# Patient Record
Sex: FEMALE | Race: WHITE | NOT HISPANIC OR LATINO | Employment: FULL TIME | URBAN - METROPOLITAN AREA
[De-identification: names, ages, dates, MRNs, and addresses within clinical notes are randomized per-mention and may not be internally consistent; named-entity substitution may affect disease eponyms.]

---

## 2017-02-01 ENCOUNTER — GENERIC CONVERSION - ENCOUNTER (OUTPATIENT)
Dept: OTHER | Facility: OTHER | Age: 37
End: 2017-02-01

## 2017-03-13 ENCOUNTER — ALLSCRIPTS OFFICE VISIT (OUTPATIENT)
Dept: OTHER | Facility: OTHER | Age: 37
End: 2017-03-13

## 2017-04-13 ENCOUNTER — ALLSCRIPTS OFFICE VISIT (OUTPATIENT)
Dept: OTHER | Facility: OTHER | Age: 37
End: 2017-04-13

## 2017-05-01 ENCOUNTER — ALLSCRIPTS OFFICE VISIT (OUTPATIENT)
Dept: OTHER | Facility: OTHER | Age: 37
End: 2017-05-01

## 2017-05-15 ENCOUNTER — APPOINTMENT (OUTPATIENT)
Dept: LAB | Facility: HOSPITAL | Age: 37
End: 2017-05-15
Attending: PODIATRIST
Payer: COMMERCIAL

## 2017-05-15 ENCOUNTER — TRANSCRIBE ORDERS (OUTPATIENT)
Dept: ADMINISTRATIVE | Facility: HOSPITAL | Age: 37
End: 2017-05-15

## 2017-05-15 ENCOUNTER — ANESTHESIA EVENT (OUTPATIENT)
Dept: PERIOP | Facility: HOSPITAL | Age: 37
End: 2017-05-15
Payer: COMMERCIAL

## 2017-05-15 ENCOUNTER — APPOINTMENT (OUTPATIENT)
Dept: PREADMISSION TESTING | Facility: HOSPITAL | Age: 37
End: 2017-05-15
Payer: COMMERCIAL

## 2017-05-15 VITALS
DIASTOLIC BLOOD PRESSURE: 74 MMHG | SYSTOLIC BLOOD PRESSURE: 146 MMHG | BODY MASS INDEX: 41.07 KG/M2 | HEIGHT: 68 IN | WEIGHT: 271 LBS | HEART RATE: 72 BPM | TEMPERATURE: 97.4 F | RESPIRATION RATE: 16 BRPM

## 2017-05-15 DIAGNOSIS — Z01.818 PREOP EXAMINATION: ICD-10-CM

## 2017-05-15 DIAGNOSIS — Z01.818 PREOP EXAMINATION: Primary | ICD-10-CM

## 2017-05-15 LAB
ANION GAP SERPL CALCULATED.3IONS-SCNC: 8 MMOL/L (ref 4–13)
BUN SERPL-MCNC: 8 MG/DL (ref 5–25)
CALCIUM SERPL-MCNC: 9.1 MG/DL (ref 8.3–10.1)
CHLORIDE SERPL-SCNC: 104 MMOL/L (ref 100–108)
CO2 SERPL-SCNC: 26 MMOL/L (ref 21–32)
CREAT SERPL-MCNC: 1.21 MG/DL (ref 0.6–1.3)
GFR SERPL CREATININE-BSD FRML MDRD: 49.8 ML/MIN/1.73SQ M
GLUCOSE SERPL-MCNC: 88 MG/DL (ref 65–140)
POTASSIUM SERPL-SCNC: 3.7 MMOL/L (ref 3.5–5.3)
SODIUM SERPL-SCNC: 138 MMOL/L (ref 136–145)

## 2017-05-15 PROCEDURE — 80048 BASIC METABOLIC PNL TOTAL CA: CPT

## 2017-05-15 PROCEDURE — 36415 COLL VENOUS BLD VENIPUNCTURE: CPT

## 2017-05-15 RX ORDER — ALBUTEROL SULFATE 90 UG/1
2 AEROSOL, METERED RESPIRATORY (INHALATION) EVERY 6 HOURS PRN
COMMUNITY

## 2017-05-15 RX ORDER — LOSARTAN POTASSIUM 25 MG/1
25 TABLET ORAL EVERY EVENING
COMMUNITY

## 2017-05-15 RX ORDER — LEVOTHYROXINE SODIUM 88 UG/1
88 TABLET ORAL EVERY EVENING
COMMUNITY
End: 2018-04-19 | Stop reason: SDUPTHER

## 2017-05-15 RX ORDER — SODIUM CHLORIDE 9 MG/ML
125 INJECTION, SOLUTION INTRAVENOUS CONTINUOUS
Status: CANCELLED | OUTPATIENT
Start: 2017-05-15

## 2017-05-17 ENCOUNTER — HOSPITAL ENCOUNTER (OUTPATIENT)
Facility: HOSPITAL | Age: 37
Setting detail: OUTPATIENT SURGERY
Discharge: HOME | End: 2017-05-17
Attending: PODIATRIST | Admitting: PODIATRIST
Payer: COMMERCIAL

## 2017-05-17 ENCOUNTER — ANESTHESIA (OUTPATIENT)
Dept: PERIOP | Facility: HOSPITAL | Age: 37
End: 2017-05-17
Payer: COMMERCIAL

## 2017-05-17 VITALS
WEIGHT: 271 LBS | TEMPERATURE: 98 F | OXYGEN SATURATION: 96 % | DIASTOLIC BLOOD PRESSURE: 77 MMHG | RESPIRATION RATE: 16 BRPM | HEART RATE: 63 BPM | HEIGHT: 68 IN | SYSTOLIC BLOOD PRESSURE: 125 MMHG | BODY MASS INDEX: 41.07 KG/M2

## 2017-05-17 DIAGNOSIS — G89.18 POST-OPERATIVE PAIN: Primary | ICD-10-CM

## 2017-05-17 LAB — EXT PREGNANCY TEST URINE: NEGATIVE

## 2017-05-17 PROCEDURE — 81025 URINE PREGNANCY TEST: CPT | Performed by: PODIATRIST

## 2017-05-17 PROCEDURE — A9270 NON-COVERED ITEM OR SERVICE: HCPCS | Performed by: PODIATRIST

## 2017-05-17 RX ORDER — PROPOFOL 10 MG/ML
INJECTION, EMULSION INTRAVENOUS CONTINUOUS PRN
Status: DISCONTINUED | OUTPATIENT
Start: 2017-05-17 | End: 2017-05-17

## 2017-05-17 RX ORDER — LIDOCAINE HYDROCHLORIDE 10 MG/ML
INJECTION, SOLUTION EPIDURAL; INFILTRATION; INTRACAUDAL; PERINEURAL AS NEEDED
Status: DISCONTINUED | OUTPATIENT
Start: 2017-05-17 | End: 2017-05-17 | Stop reason: HOSPADM

## 2017-05-17 RX ORDER — SODIUM CHLORIDE 9 MG/ML
125 INJECTION, SOLUTION INTRAVENOUS CONTINUOUS
Status: DISCONTINUED | OUTPATIENT
Start: 2017-05-17 | End: 2017-05-17 | Stop reason: HOSPADM

## 2017-05-17 RX ORDER — ONDANSETRON 2 MG/ML
4 INJECTION INTRAMUSCULAR; INTRAVENOUS EVERY 6 HOURS PRN
Status: DISCONTINUED | OUTPATIENT
Start: 2017-05-17 | End: 2017-05-17 | Stop reason: HOSPADM

## 2017-05-17 RX ORDER — OXYCODONE HYDROCHLORIDE AND ACETAMINOPHEN 5; 325 MG/1; MG/1
1 TABLET ORAL EVERY 4 HOURS PRN
Status: DISCONTINUED | OUTPATIENT
Start: 2017-05-17 | End: 2017-05-17 | Stop reason: HOSPADM

## 2017-05-17 RX ORDER — PROPOFOL 10 MG/ML
INJECTION, EMULSION INTRAVENOUS AS NEEDED
Status: DISCONTINUED | OUTPATIENT
Start: 2017-05-17 | End: 2017-05-17 | Stop reason: SURG

## 2017-05-17 RX ORDER — FENTANYL CITRATE 50 UG/ML
50 INJECTION, SOLUTION INTRAMUSCULAR; INTRAVENOUS
Status: DISCONTINUED | OUTPATIENT
Start: 2017-05-17 | End: 2017-05-17 | Stop reason: HOSPADM

## 2017-05-17 RX ORDER — FENTANYL CITRATE 50 UG/ML
INJECTION, SOLUTION INTRAMUSCULAR; INTRAVENOUS AS NEEDED
Status: DISCONTINUED | OUTPATIENT
Start: 2017-05-17 | End: 2017-05-17 | Stop reason: SURG

## 2017-05-17 RX ORDER — MIDAZOLAM HYDROCHLORIDE 1 MG/ML
INJECTION INTRAMUSCULAR; INTRAVENOUS AS NEEDED
Status: DISCONTINUED | OUTPATIENT
Start: 2017-05-17 | End: 2017-05-17 | Stop reason: SURG

## 2017-05-17 RX ORDER — DEXAMETHASONE SODIUM PHOSPHATE 4 MG/ML
INJECTION, SOLUTION INTRA-ARTICULAR; INTRALESIONAL; INTRAMUSCULAR; INTRAVENOUS; SOFT TISSUE AS NEEDED
Status: DISCONTINUED | OUTPATIENT
Start: 2017-05-17 | End: 2017-05-17 | Stop reason: HOSPADM

## 2017-05-17 RX ORDER — OXYCODONE HYDROCHLORIDE AND ACETAMINOPHEN 5; 325 MG/1; MG/1
1 TABLET ORAL EVERY 6 HOURS PRN
Qty: 12 TABLET | Refills: 0 | Status: SHIPPED | OUTPATIENT
Start: 2017-05-17 | End: 2017-05-27

## 2017-05-17 RX ADMIN — FENTANYL CITRATE 50 MCG: 50 INJECTION, SOLUTION INTRAMUSCULAR; INTRAVENOUS at 08:55

## 2017-05-17 RX ADMIN — PROPOFOL 80 MCG/KG/MIN: 10 INJECTION, EMULSION INTRAVENOUS at 08:05

## 2017-05-17 RX ADMIN — OXYCODONE HYDROCHLORIDE AND ACETAMINOPHEN 1 TABLET: 5; 325 TABLET ORAL at 10:46

## 2017-05-17 RX ADMIN — SODIUM CHLORIDE: 0.9 INJECTION, SOLUTION INTRAVENOUS at 07:57

## 2017-05-17 RX ADMIN — SODIUM CHLORIDE 125 ML/HR: 0.9 INJECTION, SOLUTION INTRAVENOUS at 07:13

## 2017-05-17 RX ADMIN — PROPOFOL 100 MG: 10 INJECTION, EMULSION INTRAVENOUS at 08:05

## 2017-05-17 RX ADMIN — MIDAZOLAM HYDROCHLORIDE 2 MG: 1 INJECTION, SOLUTION INTRAMUSCULAR; INTRAVENOUS at 07:57

## 2017-05-17 RX ADMIN — FENTANYL CITRATE 100 MCG: 50 INJECTION, SOLUTION INTRAMUSCULAR; INTRAVENOUS at 08:05

## 2017-05-22 ENCOUNTER — ALLSCRIPTS OFFICE VISIT (OUTPATIENT)
Dept: OTHER | Facility: OTHER | Age: 37
End: 2017-05-22

## 2017-09-21 ENCOUNTER — HOSPITAL ENCOUNTER (OUTPATIENT)
Dept: RADIOLOGY | Facility: MEDICAL CENTER | Age: 37
Discharge: HOME | End: 2017-09-21
Payer: COMMERCIAL

## 2017-09-21 ENCOUNTER — GENERIC CONVERSION - ENCOUNTER (OUTPATIENT)
Dept: OTHER | Facility: OTHER | Age: 37
End: 2017-09-21

## 2017-09-21 ENCOUNTER — TRANSCRIBE ORDERS (OUTPATIENT)
Dept: ADMINISTRATIVE | Facility: HOSPITAL | Age: 37
End: 2017-09-21

## 2017-09-21 DIAGNOSIS — S09.90XA HEAD INJURY, UNSPECIFIED: ICD-10-CM

## 2017-09-21 DIAGNOSIS — S09.90XA INJURY OF HEAD: ICD-10-CM

## 2017-09-21 DIAGNOSIS — S09.90XA HEAD INJURY, UNSPECIFIED: Primary | ICD-10-CM

## 2017-09-21 PROCEDURE — 70450 CT HEAD/BRAIN W/O DYE: CPT

## 2018-01-12 ENCOUNTER — GENERIC CONVERSION - ENCOUNTER (OUTPATIENT)
Dept: OTHER | Facility: OTHER | Age: 38
End: 2018-01-12

## 2018-01-12 VITALS
HEIGHT: 67 IN | SYSTOLIC BLOOD PRESSURE: 112 MMHG | WEIGHT: 274.25 LBS | DIASTOLIC BLOOD PRESSURE: 70 MMHG | OXYGEN SATURATION: 98 % | BODY MASS INDEX: 43.04 KG/M2 | RESPIRATION RATE: 16 BRPM | TEMPERATURE: 97.8 F | HEART RATE: 80 BPM

## 2018-01-12 VITALS
BODY MASS INDEX: 43.03 KG/M2 | HEIGHT: 67 IN | RESPIRATION RATE: 16 BRPM | WEIGHT: 274.2 LBS | HEART RATE: 81 BPM | SYSTOLIC BLOOD PRESSURE: 120 MMHG | TEMPERATURE: 97.5 F | OXYGEN SATURATION: 98 % | DIASTOLIC BLOOD PRESSURE: 78 MMHG

## 2018-01-13 VITALS
WEIGHT: 271.4 LBS | DIASTOLIC BLOOD PRESSURE: 80 MMHG | HEIGHT: 67 IN | SYSTOLIC BLOOD PRESSURE: 118 MMHG | HEART RATE: 76 BPM | TEMPERATURE: 97.6 F | OXYGEN SATURATION: 99 % | RESPIRATION RATE: 16 BRPM | BODY MASS INDEX: 42.6 KG/M2

## 2018-01-13 VITALS
HEART RATE: 60 BPM | HEIGHT: 67 IN | SYSTOLIC BLOOD PRESSURE: 112 MMHG | WEIGHT: 273.13 LBS | BODY MASS INDEX: 42.87 KG/M2 | DIASTOLIC BLOOD PRESSURE: 68 MMHG | RESPIRATION RATE: 16 BRPM | OXYGEN SATURATION: 98 % | TEMPERATURE: 97.6 F

## 2018-01-13 NOTE — PROGRESS NOTES
Assessment    1  Benign essential HTN (401 1) (I10)   2  Glomerulonephritis, focal sclerosing (582 1) (N05 1)   3  Hypothyroidism (244 9) (E03 9)   4  Obesity (278 00) (E66 9)   5  Plantar fasciitis (728 71) (M72 2)    Plan  Benign essential HTN    · (1) CBC/PLT/DIFF; Status:Active; Requested for:15Oct2016;   Benign essential HTN, Glomerulonephritis, focal sclerosing    · (1) COMPREHENSIVE METABOLIC PANEL; Status:Active; Requested for:15Oct2016; Health Maintenance    · (1) LIPID PANEL, FASTING; Status:Active; Requested for:15Oct2016;   Hypothyroidism    · Levothyroxine Sodium 88 MCG Oral Tablet; TAKE 1 TABLET DAILY   · (1) T4, FREE; Status:Active; Requested for:15Oct2016;    · (1) TSH; Status:Active; Requested for:15Oct2016;   Plantar fasciitis    · [facility] ( PODIATRY ) Physician Referral  eval and tx  Status: Hold For -  Scheduling  Requested for: [id number]  Care Summary provided  : Yes    Discussion/Summary  health maintenance visit the risks and benefits of cervical cancer screening were discussed cervical cancer screening is current Breast cancer screening: the risks and benefits of breast cancer screening were discussed and breast cancer screening is not indicated  Colorectal cancer screening: the risks and benefits of colorectal cancer screening were discussed and colorectal cancer screening is not indicated  Osteoporosis screening: the risks and benefits of osteoporosis screening were discussed and bone mineral density testing is not indicated  The risks and benefits of immunizations were discussed and immunizations are up to date  Advice and education were given regarding weight loss  Chief Complaint  physical      History of Present Illness  HM, Adult Female: The patient is being seen for a health maintenance evaluation  Social History: Household members include 1 daughter(s)  She is   The patient has never smoked cigarettes  She reports rare alcohol use   She has never used illicit drugs  General Health: The patient's health since the last visit is described as fair  She has regular dental visits  She denies vision problems  She denies hearing loss  Lifestyle:  She consumes a diverse and healthy diet  She has weight concerns  She exercises regularly  She does not use tobacco  She consumes alcohol  She denies drug use  Reproductive health: the patient is premenopausal    Screening: Cervical cancer screening includes a pap smear performed last year  Breast cancer screening includes no previous mammogram  She hasn't been previously screened for colorectal cancer  Metabolic screening includes lipid profile performed within the past five years and glucose screening performed last year  Cardiovascular risk factors: hypertension, but no diabetes, no tobacco use and no illicit drug use  Safety elements used: safe driving habits and smoke detector  Risk findings: no guns at home  Review of Systems    Constitutional: No fever, no chills, feels well, no tiredness, no recent weight gain or weight loss  ENT: no complaints of earache, no loss of hearing, no nose bleeds, no nasal discharge, no sore throat, no hoarseness  Cardiovascular: No complaints of slow heart rate, no fast heart rate, no chest pain, no palpitations, no leg claudication, no lower extremity edema  Respiratory: No complaints of shortness of breath, no wheezing, no cough, no SOB on exertion, no orthopnea, no PND  Active Problems    1  Asthma (493 90) (J45 909)   2  Hypothyroidism (244 9) (E03 9)   3  Obesity (278 00) (E66 9)   4  Proteinuria (791 0) (R80 9)   5   Vitamin D deficiency (268 9) (E55 9)    Past Medical History    · Acute bronchitis (466 0) (J20 9)   · History of Bacterial vaginosis (616 10,041 9) (N76 0,B96 89)   · History of Dysuria (788 1) (R30 0)   · History of dizziness (V13 89) (L52 249)   · History of endometriosis (V13 29) (Z87 42)   · History of Peroneal tendinitis of right lower extremity (726 79) (M76 71)   · History of Plantar fasciitis (728 71) (M72 2)   · History of Strep pharyngitis (034 0) (J02 0)   · History of Tibialis posterior tendinitis (726 72) (M76 829)   · History of Vaginitis (616 10) (N76 0)    Surgical History    · History of Biopsy Renal   · History of Oral Surgery Tooth Extraction   · History of Surgery Excision Of Lingual Tonsils   · History of Tonsillectomy   · History of Uvulectomy    Family History  Mother    · Family history unknown (V49 89) (Z73 8)  Father    · Family history of hypertension (V17 49) (Z82 49)  Paternal Grandmother    · Family history of diabetes mellitus (V18 0) (Z83 3)  Aunt    · Family history of mental disorder (V17 0) (Z81 8)  Uncle    · Family history of mental disorder (V17 0) (Z81 8)  Cousin    · Family history of mental disorder (V17 0) (Z81 8)    Social History    · Always uses seat belt   · Drinks caffeinated tea   · Never a smoker   · Wears helmet with cycling    Current Meds   1  Bactrim -160 MG Oral Tablet; one tab q m-w-f; Therapy: 39ZGY1776 to Recorded   2  Cozaar 25 MG Oral Tablet; Therapy: 31PLY3245 to Recorded   3  CycloSPORINE 100 MG Oral Capsule; Therapy: 19ZOQ6574 to Recorded   4  CycloSPORINE Modified 50 MG Oral Capsule; 3 TABS IN AM;   Therapy: 80LJQ6283 to Recorded   5  HydroCHLOROthiazide 25 MG Oral Tablet; TAKE 1 TABLET DAILY; Therapy: 41MQX5348 to (Evaluate:34Dkm4974) Recorded   6  Levothyroxine Sodium 88 MCG Oral Tablet; TAKE 1 TABLET DAILY; Therapy: 40Ufp8862 to (Evaluate:14Mar2017)  Requested for: 60Wdk2324; Last   Rx:25Woq3562 Ordered   7  Losartan Potassium 25 MG Oral Tablet; TAKE 1 TABLET DAILY AS DIRECTED; Therapy: 70MPA0301 to (Evaluate:87Uvl8966) Recorded   8  Norethindrone Acetate 5 MG Oral Tablet; Therapy: (Recorded:11Nov2015) to Recorded   9  PredniSONE 5 MG Oral Tablet; daily; Therapy: 28JQY7409 to Recorded   10   Vitamin D (Ergocalciferol) 29156 UNIT Oral Capsule; TAKE 1 CAPSULE WEEKLY; Therapy: 74PRS5571 to (Last Rx:04Mar2015)  Requested for: 11BFZ3542 Ordered   11  ZyrTEC Allergy 10 MG Oral Tablet; TAKE 1 TABLET DAILY AS DIRECTED; Therapy: 11Rbc2790 to (Evaluate:17Oct2014) Recorded    Allergies    1  Morphine Derivatives    Vitals   Recorded: 94DTA1945 28:36UW   Systolic 389, LUE, Sitting   Diastolic 86, LUE, Sitting   Heart Rate 74, L Brachial Artery   Pulse Quality Normal, L Brachial Artery   Respiration Quality Normal   Respiration 16   Temperature 96 9 F, Tympanic   LMP 01Oct2016   Height 5 ft 7 in   Weight 272 lb    BMI Calculated 42 6   BSA Calculated 2 3     Physical Exam    Constitutional   General appearance: Abnormal   obese  Head and Face   Head and face: Normal     Palpation of the face and sinuses: No sinus tenderness  Eyes   Conjunctiva and lids: No swelling, erythema or discharge  Pupils and irises: Equal, round, reactive to light  Ears, Nose, Mouth, and Throat   External inspection of ears and nose: Normal     Otoscopic examination: Tympanic membranes translucent with normal light reflex  Canals patent without erythema  Hearing: Normal     Nasal mucosa, septum, and turbinates: Normal without edema or erythema  Lips, teeth, and gums: Normal, good dentition  Oropharynx: Normal with no erythema, edema, exudate or lesions  Neck   Neck: Supple, symmetric, trachea midline, no masses  Thyroid: Normal, no thyromegaly  Pulmonary   Respiratory effort: No increased work of breathing or signs of respiratory distress  Auscultation of lungs: Clear to auscultation  Cardiovascular   Auscultation of heart: Normal rate and rhythm, normal S1 and S2, no murmurs  Abdomen   Abdomen: Non-tender, no masses  Liver and spleen: No hepatomegaly or splenomegaly  Genitourinary   External genitalia and vagina: Normal, no lesions appreciated         Results/Data  PHQ-2 Adult Depression Screening 15Oct2016 08:59AM User, Ahs     Test Name Result Flag Reference   PHQ-2 Adult Depression Score 0     Over the last two weeks, how often have you been bothered by any of the following problems? Little interest or pleasure in doing things: Not at all - 0  Feeling down, depressed, or hopeless: Not at all - 0   PHQ-2 Adult Depression Screening Negative       Urine Dip Non-Automated- POC 11VDQ3023 08:59AM Dulce Maria Quiles     Test Name Result Flag Reference   Color Yellow     Clarity Transparent     Leukocytes negative     Nitrite negative     Blood negative     Bilirubin negative     Urobilinogen negative     Protein ++100     Ph 5     Specific Gravity 1 020     Ketone negative     Glucose negative         Procedure    Procedure: Hearing Acuity Test    Indication: Routine screeing  Audiometry: Normal bilaterally  Hearing in the right ear: 20 decibals at 500 hertz, 20 decibals at 1000 hertz, 20 decibals at 2000 hertz, 20 decibals at 4000 hertz, 20 decibals at 6000 hertz and 20 decibals at 8000 hertz  Hearing in the left ear: 20 decibals at 500 hertz, 20 decibals at 1000 hertz, 20 decibals at 2000 hertz, 20 decibals at 4000 hertz, 20 decibals at 6000 hertz and 20 decibals at 8000 hertz  Procedure: Visual Acuity Test    Indication: routine screening  Results: 20/15 in both eyes with corrective device, 20/15 in the right eye with corrective device, 20/15 in the left eye with corrective device normal in both eyes  Color vision was screened with the [place] Test and the results were normal    The patient tolerated the procedure well  There were no complications  Future Appointments    Date/Time Provider Specialty Site   04/12/2017 06:00 PM DARYA Mcgrath   Family Medicine [address] [place] [address]   Electronically signed by : Bala Pat DO; Oct 15 2016  2:02PM EST                       (Author)

## 2018-01-17 ENCOUNTER — TRANSCRIBE ORDERS (OUTPATIENT)
Dept: ADMINISTRATIVE | Facility: HOSPITAL | Age: 38
End: 2018-01-17

## 2018-01-17 DIAGNOSIS — N04.1 NEPHROTIC SYNDROME WITH FOCAL GLOMERULOSCLEROSIS: Primary | ICD-10-CM

## 2018-01-17 DIAGNOSIS — N18.2 CHRONIC KIDNEY DISEASE, STAGE II (MILD): ICD-10-CM

## 2018-01-17 DIAGNOSIS — E78.5 HYPERLIPIDEMIA, UNSPECIFIED HYPERLIPIDEMIA TYPE: ICD-10-CM

## 2018-01-17 DIAGNOSIS — L98.9 SKIN LESION: ICD-10-CM

## 2018-01-17 DIAGNOSIS — R80.9 PROTEINURIA, UNSPECIFIED TYPE: ICD-10-CM

## 2018-01-22 VITALS
BODY MASS INDEX: 42.53 KG/M2 | DIASTOLIC BLOOD PRESSURE: 78 MMHG | OXYGEN SATURATION: 97 % | HEART RATE: 72 BPM | TEMPERATURE: 97.6 F | RESPIRATION RATE: 16 BRPM | HEIGHT: 67 IN | WEIGHT: 271 LBS | SYSTOLIC BLOOD PRESSURE: 108 MMHG

## 2018-01-24 VITALS
HEIGHT: 67 IN | TEMPERATURE: 98.2 F | RESPIRATION RATE: 16 BRPM | BODY MASS INDEX: 41.91 KG/M2 | HEART RATE: 87 BPM | OXYGEN SATURATION: 98 % | DIASTOLIC BLOOD PRESSURE: 70 MMHG | WEIGHT: 267 LBS | SYSTOLIC BLOOD PRESSURE: 116 MMHG

## 2018-02-15 ENCOUNTER — APPOINTMENT (OUTPATIENT)
Dept: LAB | Facility: CLINIC | Age: 38
End: 2018-02-15
Payer: COMMERCIAL

## 2018-02-15 DIAGNOSIS — E78.5 HYPERLIPIDEMIA, UNSPECIFIED HYPERLIPIDEMIA TYPE: ICD-10-CM

## 2018-02-15 DIAGNOSIS — N04.1 NEPHROTIC SYNDROME WITH FOCAL GLOMERULOSCLEROSIS: ICD-10-CM

## 2018-02-15 DIAGNOSIS — R80.9 PROTEINURIA, UNSPECIFIED TYPE: ICD-10-CM

## 2018-02-15 DIAGNOSIS — N18.2 CHRONIC KIDNEY DISEASE, STAGE II (MILD): ICD-10-CM

## 2018-02-15 LAB
CHOLEST SERPL-MCNC: 183 MG/DL (ref 50–200)
HDLC SERPL-MCNC: 44 MG/DL (ref 40–60)
LDLC SERPL CALC-MCNC: 120 MG/DL (ref 0–100)
TRIGL SERPL-MCNC: 97 MG/DL
TSH SERPL DL<=0.05 MIU/L-ACNC: 1.08 UIU/ML (ref 0.36–3.74)

## 2018-02-15 PROCEDURE — 84443 ASSAY THYROID STIM HORMONE: CPT

## 2018-02-15 PROCEDURE — 80061 LIPID PANEL: CPT

## 2018-02-15 PROCEDURE — 36415 COLL VENOUS BLD VENIPUNCTURE: CPT

## 2018-02-26 ENCOUNTER — OFFICE VISIT (OUTPATIENT)
Dept: PLASTIC SURGERY | Facility: CLINIC | Age: 38
End: 2018-02-26
Payer: COMMERCIAL

## 2018-02-26 VITALS — HEIGHT: 68 IN | WEIGHT: 273.4 LBS | BODY MASS INDEX: 41.43 KG/M2

## 2018-02-26 DIAGNOSIS — L98.9 SKIN LESION: ICD-10-CM

## 2018-02-26 PROCEDURE — 88305 TISSUE EXAM BY PATHOLOGIST: CPT | Performed by: PATHOLOGY

## 2018-02-26 PROCEDURE — 88305 TISSUE EXAM BY PATHOLOGIST: CPT | Performed by: PHYSICIAN ASSISTANT

## 2018-02-26 PROCEDURE — 11100 PR BIOPSY OF SKIN LESION: CPT | Performed by: PHYSICIAN ASSISTANT

## 2018-02-26 PROCEDURE — 99203 OFFICE O/P NEW LOW 30 MIN: CPT | Performed by: PHYSICIAN ASSISTANT

## 2018-02-26 NOTE — PROGRESS NOTES
Assessment/Plan:  Ladonna Vital is a pleasant 22-year-old female who presents to our office for evaluation of a skin lesion on her right neck  Please see HPI  I did a biopsy in the office today  She was given instructions on how to care for the wound  We will call her with the results  Diagnoses and all orders for this visit:    Skin lesion  -     Ambulatory referral to Plastic Surgery  -     Tissue Exam          Subjective:      Patient ID: Thi Friday is a 42 y o  female  HPI   Ladonna Vital is a pleasant 22-year-old female who presents to our office for evaluation of a skin lesion on her right neck  She states that the lesion has been present for years however it has now become painful over the last month  The following portions of the patient's history were reviewed and updated as appropriate: allergies, current medications, past family history, past medical history, past social history, past surgical history and problem list     Review of Systems   HENT: Negative for hearing loss  Eyes: Negative for visual disturbance  Respiratory: Negative for shortness of breath  Cardiovascular: Negative for chest pain  Gastrointestinal: Negative for abdominal pain, blood in stool, constipation, diarrhea, nausea and vomiting  Genitourinary: Negative for hematuria  Musculoskeletal: Negative for gait problem  Skin:        As per HPI  Neurological: Negative for seizures and headaches  Hematological: Does not bruise/bleed easily  Psychiatric/Behavioral: The patient is not nervous/anxious  Objective:      Ht 5' 7 5" (1 715 m)   Wt 124 kg (273 lb 6 4 oz)   BMI 42 19 kg/m²          Physical Exam   Constitutional: She is oriented to person, place, and time  She appears well-developed and well-nourished  No distress  HENT:   Head: Normocephalic and atraumatic  Eyes: EOM are normal  Pupils are equal, round, and reactive to light  No scleral icterus  Neck: Neck supple   No tracheal deviation present  No thyromegaly present  Cardiovascular: Normal rate and regular rhythm  Exam reveals no gallop and no friction rub  No murmur heard  Pulmonary/Chest: Effort normal and breath sounds normal  She has no wheezes  She has no rales  Abdominal: Soft  Bowel sounds are normal  She exhibits no distension  There is no tenderness  There is no rebound and no guarding  Musculoskeletal: Normal range of motion  Lymphadenopathy:     She has no cervical adenopathy  Neurological: She is alert and oriented to person, place, and time  No cranial nerve deficit  Skin:   Right neck skin lesion noted measuring approximately 3 mm round and raised and brown in color  It appears irritated  Photos taken  Psychiatric: She has a normal mood and affect  Procedure:  Verbal consent obtained  The neck was prepped with alcohol  I infiltrated an area with 1% lidocaine with epinephrine around the lesion  Shave biopsy was performed and a dressing applied

## 2018-02-26 NOTE — LETTER
February 26, 2018     Sejal Levine [address] [facility] [address]    Patient: Monika Chirinos   YOB: 1980   Date of Visit: 2/26/2018       Dear Dr Gino Keith:    Thank you for referring Monika Chirinos to me for evaluation  Below are my notes for this consultation  If you have questions, please do not hesitate to call me  I look forward to following your patient along with you           Sincerely,        Mahi Cain PA-C        CC: Joe Huston MD

## 2018-03-28 ENCOUNTER — HOSPITAL ENCOUNTER (EMERGENCY)
Facility: HOSPITAL | Age: 38
Discharge: HOME | End: 2018-03-28
Attending: EMERGENCY MEDICINE | Admitting: EMERGENCY MEDICINE
Payer: COMMERCIAL

## 2018-03-28 ENCOUNTER — APPOINTMENT (EMERGENCY)
Dept: CT IMAGING | Facility: HOSPITAL | Age: 38
End: 2018-03-28
Payer: COMMERCIAL

## 2018-03-28 VITALS
WEIGHT: 276.02 LBS | SYSTOLIC BLOOD PRESSURE: 114 MMHG | OXYGEN SATURATION: 97 % | HEART RATE: 71 BPM | BODY MASS INDEX: 42.59 KG/M2 | DIASTOLIC BLOOD PRESSURE: 59 MMHG | TEMPERATURE: 97.4 F | RESPIRATION RATE: 18 BRPM

## 2018-03-28 DIAGNOSIS — R51.9 HEADACHE: Primary | ICD-10-CM

## 2018-03-28 LAB
ALBUMIN SERPL BCP-MCNC: 3.4 G/DL (ref 3.5–5)
ALP SERPL-CCNC: 73 U/L (ref 46–116)
ALT SERPL W P-5'-P-CCNC: 26 U/L (ref 12–78)
ANION GAP SERPL CALCULATED.3IONS-SCNC: 11 MMOL/L (ref 4–13)
AST SERPL W P-5'-P-CCNC: 20 U/L (ref 5–45)
BASOPHILS # BLD AUTO: 0.02 THOUSANDS/ΜL (ref 0–0.1)
BASOPHILS NFR BLD AUTO: 0 % (ref 0–1)
BILIRUB SERPL-MCNC: 0.4 MG/DL (ref 0.2–1)
BUN SERPL-MCNC: 15 MG/DL (ref 5–25)
CALCIUM SERPL-MCNC: 8.7 MG/DL (ref 8.3–10.1)
CHLORIDE SERPL-SCNC: 104 MMOL/L (ref 100–108)
CO2 SERPL-SCNC: 27 MMOL/L (ref 21–32)
CREAT SERPL-MCNC: 1.36 MG/DL (ref 0.6–1.3)
EOSINOPHIL # BLD AUTO: 0.18 THOUSAND/ΜL (ref 0–0.61)
EOSINOPHIL NFR BLD AUTO: 2 % (ref 0–6)
ERYTHROCYTE [DISTWIDTH] IN BLOOD BY AUTOMATED COUNT: 13.6 % (ref 11.6–15.1)
EXT PREG TEST URINE: NEGATIVE
GFR SERPL CREATININE-BSD FRML MDRD: 49 ML/MIN/1.73SQ M
GLUCOSE SERPL-MCNC: 95 MG/DL (ref 65–140)
HCT VFR BLD AUTO: 42.9 % (ref 34.8–46.1)
HGB BLD-MCNC: 14.3 G/DL (ref 11.5–15.4)
LYMPHOCYTES # BLD AUTO: 2.89 THOUSANDS/ΜL (ref 0.6–4.47)
LYMPHOCYTES NFR BLD AUTO: 32 % (ref 14–44)
MAGNESIUM SERPL-MCNC: 1.9 MG/DL (ref 1.6–2.6)
MCH RBC QN AUTO: 27.8 PG (ref 26.8–34.3)
MCHC RBC AUTO-ENTMCNC: 33.3 G/DL (ref 31.4–37.4)
MCV RBC AUTO: 83 FL (ref 82–98)
MONOCYTES # BLD AUTO: 0.77 THOUSAND/ΜL (ref 0.17–1.22)
MONOCYTES NFR BLD AUTO: 9 % (ref 4–12)
NEUTROPHILS # BLD AUTO: 5.08 THOUSANDS/ΜL (ref 1.85–7.62)
NEUTS SEG NFR BLD AUTO: 57 % (ref 43–75)
PLATELET # BLD AUTO: 344 THOUSANDS/UL (ref 149–390)
PMV BLD AUTO: 8.8 FL (ref 8.9–12.7)
POTASSIUM SERPL-SCNC: 4.1 MMOL/L (ref 3.5–5.3)
PROT SERPL-MCNC: 6.9 G/DL (ref 6.4–8.2)
RBC # BLD AUTO: 5.15 MILLION/UL (ref 3.81–5.12)
SODIUM SERPL-SCNC: 142 MMOL/L (ref 136–145)
WBC # BLD AUTO: 8.94 THOUSAND/UL (ref 4.31–10.16)

## 2018-03-28 PROCEDURE — 70450 CT HEAD/BRAIN W/O DYE: CPT

## 2018-03-28 PROCEDURE — 80053 COMPREHEN METABOLIC PANEL: CPT | Performed by: EMERGENCY MEDICINE

## 2018-03-28 PROCEDURE — 83735 ASSAY OF MAGNESIUM: CPT | Performed by: EMERGENCY MEDICINE

## 2018-03-28 PROCEDURE — 36415 COLL VENOUS BLD VENIPUNCTURE: CPT | Performed by: EMERGENCY MEDICINE

## 2018-03-28 PROCEDURE — 96374 THER/PROPH/DIAG INJ IV PUSH: CPT

## 2018-03-28 PROCEDURE — 99284 EMERGENCY DEPT VISIT MOD MDM: CPT

## 2018-03-28 PROCEDURE — 96375 TX/PRO/DX INJ NEW DRUG ADDON: CPT

## 2018-03-28 PROCEDURE — 85025 COMPLETE CBC W/AUTO DIFF WBC: CPT | Performed by: EMERGENCY MEDICINE

## 2018-03-28 PROCEDURE — 81025 URINE PREGNANCY TEST: CPT | Performed by: EMERGENCY MEDICINE

## 2018-03-28 RX ORDER — METOCLOPRAMIDE HYDROCHLORIDE 5 MG/ML
10 INJECTION INTRAMUSCULAR; INTRAVENOUS ONCE
Status: COMPLETED | OUTPATIENT
Start: 2018-03-28 | End: 2018-03-28

## 2018-03-28 RX ORDER — DEXAMETHASONE SODIUM PHOSPHATE 10 MG/ML
10 INJECTION, SOLUTION INTRAMUSCULAR; INTRAVENOUS ONCE
Status: COMPLETED | OUTPATIENT
Start: 2018-03-28 | End: 2018-03-28

## 2018-03-28 RX ORDER — DIPHENHYDRAMINE HYDROCHLORIDE 50 MG/ML
25 INJECTION INTRAMUSCULAR; INTRAVENOUS ONCE
Status: COMPLETED | OUTPATIENT
Start: 2018-03-28 | End: 2018-03-28

## 2018-03-28 RX ADMIN — DIPHENHYDRAMINE HYDROCHLORIDE 25 MG: 50 INJECTION, SOLUTION INTRAMUSCULAR; INTRAVENOUS at 01:16

## 2018-03-28 RX ADMIN — DEXAMETHASONE SODIUM PHOSPHATE 10 MG: 10 INJECTION, SOLUTION INTRAMUSCULAR; INTRAVENOUS at 01:22

## 2018-03-28 RX ADMIN — METOCLOPRAMIDE 10 MG: 5 INJECTION, SOLUTION INTRAMUSCULAR; INTRAVENOUS at 01:18

## 2018-03-28 NOTE — DISCHARGE INSTRUCTIONS
General Headache   WHAT YOU NEED TO KNOW:   Headache pain may be mild or severe  Common causes include stress, medicines, and head injuries  Sleep problems, allergies, and hormone changes can also cause a headache  You may have frequent headaches that have no clear cause  Pain may start in another part of your body and move to your head  Headache pain can also move to other parts of your body  A headache can cause other symptoms, such as nausea and vomiting  A severe headache may be a sign of a stroke or other serious problem that needs immediate treatment  DISCHARGE INSTRUCTIONS:   Call 911 for any of the following:   · You have any of the following signs of a stroke:      ¨ Numbness or drooping on one side of your face     ¨ Weakness in an arm or leg    ¨ Confusion or difficulty speaking    ¨ Dizziness, a severe headache, or vision loss    Return to the emergency department if:   · You have a headache with neck stiffness and a fever  · You have a constant headache and are vomiting  · You have severe pain that does not get better after you take pain medicine  · You have a headache and the pain worsens when you look into light  · You have a headache and vision changes, such as blurred vision  · You have a headache and are forgetful or confused  Contact your healthcare provider if:   · You have a headache each day that does not get better, even after treatment  · You have changes in your headaches, or new symptoms that occur when you have a headache  · Others you live or work with also have headaches  · You have questions or concerns about your condition or care  Medicines: You may need any of the following:  · Medicines  may be given to prevent or treat headache pain  Do not wait until the pain is severe to take your medicine  Ask your healthcare provider how to take the medicine safely  · NSAIDs , such as ibuprofen, help decrease swelling, pain, and fever   This medicine is available with or without a doctor's order  NSAIDs can cause stomach bleeding or kidney problems in certain people  If you take blood thinner medicine, always ask if NSAIDs are safe for you  Always read the medicine label and follow directions  Do not give these medicines to children under 10months of age without direction from your child's healthcare provider  · Acetaminophen  decreases pain and fever  It is available without a doctor's order  Ask how much to take and how often to take it  Follow directions  Read the labels of all other medicines you are using to see if they also contain acetaminophen, or ask your doctor or pharmacist  Acetaminophen can cause liver damage if not taken correctly  Do not use more than 4 grams (4,000 milligrams) total of acetaminophen in one day  · Antinausea medicine  may be given to calm your stomach and help prevent vomiting  · Take your medicine as directed  Contact your healthcare provider if you think your medicine is not helping or if you have side effects  Tell him of her if you are allergic to any medicine  Keep a list of the medicines, vitamins, and herbs you take  Include the amounts, and when and why you take them  Bring the list or the pill bottles to follow-up visits  Carry your medicine list with you in case of an emergency  Manage your symptoms:   · Rest in a dark and quiet room  This may help decrease your pain  · Apply heat or ice as directed  Heat or ice may help decrease pain or muscle spasms  Apply heat or ice on the area for 20 minutes every 2 hours for as many days as directed  Your healthcare provider may recommend that you alternate heat and ice  · Relax your muscles to help relieve a headache  Lie down in a comfortable position and close your eyes  Relax your muscles slowly  Start at your toes and work your way up your body  A massage or warm bath may also help relax your muscles    Keep a headache record:  Record the dates and times that you get headaches, and what you were doing before the headache started  Also record what you ate and drank in the 24 hours before the headache started  This might help your healthcare provider find the cause of your headaches and make a treatment plan  The record can also help you avoid headache triggers or manage your symptoms  Get enough sleep:  You should get 8 to 10 hours of sleep each night  Create a sleep schedule  Go to bed and wake up at the same times each day  It may be helpful to do something relaxing before bed  Do not watch television right before bed  Do not smoke:  Nicotine and other chemicals in cigarettes and cigars can trigger a headache or make it worse  Ask your healthcare provider for information if you currently smoke and need help to quit  E-cigarettes or smokeless tobacco still contain nicotine  Talk to your healthcare provider before you use these products  Drink liquids as directed: You may need to drink more liquid to prevent dehydration  Dehydration can cause a headache  Ask your healthcare provider how much liquid to drink each day and which liquids are best for you  Limit caffeine and alcohol as directed: Your headaches may be triggered by caffeine or alcohol  You may also develop a headache if you drink caffeine regularly and suddenly stop  Eat a variety of healthy foods:  Do not skip meals  Too little food can trigger a headache  Include fruits, vegetables, whole-grain breads, low-fat dairy products, beans, lean meat, and fish  Do not have trigger foods, such as chocolate and red wine  Foods that contain gluten, nitrates, MSG, or artificial sweeteners may also trigger a headache  Follow up with your healthcare provider as directed:  Write down your questions so you remember to ask them during your visits  © 2017 [employer] Information is for End User's use only and may not be sold, redistributed or otherwise used for commercial purposes   All illustrations and images included in CareNotes® are the copyrighted property of [employer]  or [employer]  The above information is an  only  It is not intended as medical advice for individual conditions or treatments  Talk to your doctor, nurse or pharmacist before following any medical regimen to see if it is safe and effective for you

## 2018-03-28 NOTE — ED PROVIDER NOTES
History  Chief Complaint   Patient presents with    Headache     Pt c/o a headache x2 weeks  Pt reports taking tylenol/alieve and nothing has helped her pain  Pt took her BP today and they were 150/105, 149/95, 151/117  Pt in ER with c/o headache x 2wks, unresolved with tylenol  Pain is located left occipital region and left frontal region  She denies a hx of similar headaches  She denies n/v/photophobia/phonophobia  Pt also with a hx of FSGS, is on cozaar, and checked her BP earlier and it was in the 150s/100s  She denies chest pain or SOB  Prior to Admission Medications   Prescriptions Last Dose Informant Patient Reported? Taking?    Cetirizine HCl (ZYRTEC PO) Past Week at Unknown time Self Yes Yes   Sig: Take by mouth as needed   VITAMIN D, ERGOCALCIFEROL, PO 3/28/2018 at Unknown time Self Yes Yes   Sig: Take 5,000 Units by mouth daily   albuterol (PROVENTIL HFA,VENTOLIN HFA) 90 mcg/act inhaler   Yes Yes   Sig: Inhale 2 puffs every 6 (six) hours as needed for wheezing   levothyroxine (SYNTHROID) 88 mcg tablet 3/28/2018 at Unknown time Self Yes Yes   Sig: Take 88 mcg by mouth every evening   losartan (COZAAR) 25 mg tablet 3/28/2018 at Unknown time Self Yes Yes   Sig: Take 25 mg by mouth every evening   sertraline (ZOLOFT) 50 mg tablet 3/28/2018 at Unknown time Self Yes Yes   Sig: Take 50 mg by mouth daily      Facility-Administered Medications: None       Past Medical History:   Diagnosis Date    Allergy to cats     Anesthesia     "after lingual surgery throat swelled and intubated 4 days in ICU"    Asthma     Chronic headaches     Depression     Disease of thyroid gland     Focal chronic glomerulonephritis     Hypothyroidism     Left foot pain     Plantar fasciitis of left foot     Pollen allergy     Wears glasses        Past Surgical History:   Procedure Laterality Date   Ladonna Comp, LINGUAL      May 2013 [facility]    LAPAROSCOPIC ENDOMETRIOSIS FULGURATION      WI ANKLE SCOPE,PLANTAR FASCIOTOMY Left 5/17/2017    Procedure: RELEASE FASCIA PLANTAR/FASCIOTOMY ENDOSCOPIC (EPF); Surgeon: Coleman Hassan DPM;  Location: [facility];  Service: Podiatry    RENAL BIOPSY      TONSILLECTOMY      VULVA SURGERY      age 23       History reviewed  No pertinent family history  I have reviewed and agree with the history as documented  Social History   Substance Use Topics    Smoking status: Never Smoker    Smokeless tobacco: Never Used    Alcohol use Yes      Comment: very rarely        Review of Systems   Constitutional: Negative for chills and fever  Neurological: Positive for headaches  Negative for dizziness, tremors, syncope and weakness  All other systems reviewed and are negative  Physical Exam  ED Triage Vitals   Temperature Pulse Respirations Blood Pressure SpO2   03/28/18 0009 03/28/18 0009 03/28/18 0009 03/28/18 0009 03/28/18 0009   (!) 97 4 °F (36 3 °C) 71 18 147/84 99 %      Temp Source Heart Rate Source Patient Position - Orthostatic VS BP Location FiO2 (%)   03/28/18 0009 03/28/18 0009 03/28/18 0250 03/28/18 0009 --   Oral Monitor Lying Right arm       Pain Score       03/28/18 0009       8           Orthostatic Vital Signs  Vitals:    03/28/18 0009 03/28/18 0250   BP: 147/84 114/59   Pulse: 71 71   Patient Position - Orthostatic VS:  Lying       Physical Exam   Constitutional: She appears well-developed and well-nourished  No distress  HENT:   Head: Normocephalic and atraumatic  Eyes: Conjunctivae are normal  Pupils are equal, round, and reactive to light  Neck: Normal range of motion  Neck supple  Cardiovascular: Normal rate, regular rhythm and normal heart sounds  No murmur heard  Pulmonary/Chest: Effort normal and breath sounds normal  No respiratory distress  Abdominal: Soft  Bowel sounds are normal  She exhibits no distension  There is no tenderness  Musculoskeletal: Normal range of motion  She exhibits no edema or deformity  Neurological: She is alert  No cranial nerve deficit  Skin: Skin is warm and dry  No rash noted  She is not diaphoretic  No pallor  Psychiatric: She has a normal mood and affect  Her behavior is normal    Nursing note and vitals reviewed  ED Medications  Medications   dexamethasone (PF) (DECADRON) injection 10 mg (10 mg Intravenous Given 3/28/18 0122)   metoclopramide (REGLAN) injection 10 mg (10 mg Intravenous Given 3/28/18 0118)   diphenhydrAMINE (BENADRYL) injection 25 mg (25 mg Intravenous Given 3/28/18 0116)       Diagnostic Studies  Results Reviewed     Procedure Component Value Units Date/Time    Comprehensive metabolic panel [[id number]]  (Abnormal) Collected:  03/28/18 0107    Lab Status:  Final result Specimen:  Blood from Arm, Left Updated:  03/28/18 0129     Sodium 142 mmol/L      Potassium 4 1 mmol/L      Chloride 104 mmol/L      CO2 27 mmol/L      Anion Gap 11 mmol/L      BUN 15 mg/dL      Creatinine 1 36 (H) mg/dL      Glucose 95 mg/dL      Calcium 8 7 mg/dL      AST 20 U/L      ALT 26 U/L      Alkaline Phosphatase 73 U/L      Total Protein 6 9 g/dL      Albumin 3 4 (L) g/dL      Total Bilirubin 0 40 mg/dL      eGFR 49 ml/min/1 73sq m     Narrative:         National Kidney Disease Education Program recommendations are as follows:  GFR calculation is accurate only with a steady state creatinine  Chronic Kidney disease less than 60 ml/min/1 73 sq  meters  Kidney failure less than 15 ml/min/1 73 sq  meters      Magnesium [[id number]]  (Normal) Collected:  03/28/18 0107    Lab Status:  Final result Specimen:  Blood from Arm, Left Updated:  03/28/18 0129     Magnesium 1 9 mg/dL     CBC and differential [[id number]]  (Abnormal) Collected:  03/28/18 0107    Lab Status:  Final result Specimen:  Blood from Arm, Left Updated:  03/28/18 0115     WBC 8 94 Thousand/uL      RBC 5 15 (H) Million/uL      Hemoglobin 14 3 g/dL      Hematocrit 42 9 %      MCV 83 fL      MCH 27 8 pg      MCHC 33 3 g/dL      RDW 13 6 %      MPV 8 8 (L) fL      Platelets 324 Thousands/uL      Neutrophils Relative 57 %      Lymphocytes Relative 32 %      Monocytes Relative 9 %      Eosinophils Relative 2 %      Basophils Relative 0 %      Neutrophils Absolute 5 08 Thousands/µL      Lymphocytes Absolute 2 89 Thousands/µL      Monocytes Absolute 0 77 Thousand/µL      Eosinophils Absolute 0 18 Thousand/µL      Basophils Absolute 0 02 Thousands/µL     POCT pregnancy, urine [[id number]]  (Normal) Resulted:  03/28/18 0113    Lab Status:  Final result Updated:  03/28/18 0113     EXT PREG TEST UR (Ref: Negative) negative                 CT head without contrast   Final Result by Corey Puga MD (03/28 9244)      No acute intracranial abnormality  Findings are consistent with the preliminary report from [facility] which was provided shortly after completion of the exam                Workstation performed: [id number]                    Procedures  Procedures       Phone Contacts  ED Phone Contact    ED Course  ED Course                                MDM  Number of Diagnoses or Management Options  Headache:   Diagnosis management comments: FINDINGS:  Brain: Unremarkable  No hemorrhage  No significant white matter disease  No edema  Ventricles: Unremarkable  No ventriculomegaly  Bones/joints: Unremarkable  No acute fracture  Soft tissues: Unremarkable  Sinuses: Unremarkable as visualized  No acute sinusitis  Mastoid air cells: Unremarkable as visualized  No mastoid effusion  IMPRESSION:  No acute intracranial pathology  Pt's labs reviewed and creat has improved when compared to most recent labs  Headache has resolved and BP has improved   Will d/c to home with PCP f/u       Amount and/or Complexity of Data Reviewed  Clinical lab tests: ordered and reviewed  Tests in the radiology section of CPT®: ordered and reviewed      CritCare Time    Disposition  Final diagnoses:   Headache     Time reflects when diagnosis was documented in both MDM as applicable and the Disposition within this note     Time User Action Codes Description Comment    3/28/2018  3:10 AM Ty Zaldivarilana Add [R51] Headache       ED Disposition     ED Disposition Condition Comment    Discharge  Samule Spurling discharge to home/self care  Condition at discharge: Good        Follow-up Information     Follow up With Specialties Details Why Contact Info    Tami Ring MD [facility] Medicine Schedule an appointment as soon as possible for a visit in 1 day for follow up [address]  Ritika Alexandra MD Neurology Schedule an appointment as soon as possible for a visit for follow up [address] [place] [address] [address]  [phone]          Discharge Medication List as of 3/28/2018  3:11 AM      CONTINUE these medications which have NOT CHANGED    Details   albuterol (PROVENTIL HFA,VENTOLIN HFA) 90 mcg/act inhaler Inhale 2 puffs every 6 (six) hours as needed for wheezing, Until Discontinued, Historical Med      Cetirizine HCl (ZYRTEC PO) Take by mouth as needed, Historical Med      levothyroxine (SYNTHROID) 88 mcg tablet Take 88 mcg by mouth every evening, Historical Med      losartan (COZAAR) 25 mg tablet Take 25 mg by mouth every evening, Historical Med      sertraline (ZOLOFT) 50 mg tablet Take 50 mg by mouth daily, Historical Med      VITAMIN D, ERGOCALCIFEROL, PO Take 5,000 Units by mouth daily, Historical Med           No discharge procedures on file      ED Provider  Electronically Signed by           Anthony Morris DO  03/28/18 0623

## 2018-03-29 ENCOUNTER — VBI (OUTPATIENT)
Dept: ADMINISTRATIVE | Facility: OTHER | Age: 38
End: 2018-03-29

## 2018-03-29 NOTE — TELEPHONE ENCOUNTER
Joni Mendez    ED Visit Information     Ed visit date:3/27/18  Diagnosis Description: Headache  In Network? Yes [address]  Discharge status: Home  Discharged with meds ? No  Number of ED visits to date: 1  ED Severity:3     Outreach Information    Outreach successful: Yes 2  Date letter mailed:n/a  Date Finalized:3/29/18    Care Coordination    Follow up appointment with pcp: yes sent message to office for f/u appt  Transportation issues ? No    [employer] type:  3 Day Outreach  Patient refsued the answer questions:  No  Emergent necessity warranted by diagnosis:  Yes  [facility] PCP:  Yes  Transportation:  Friend/Family Transport  Called PCP first?:  Yes  Told to go to ED by PCP?:  No  Same-Day or Next Day Appointment Offered?:  No  Would have used same-day or next-day if offered?:  Yes  Feels able to call PCP for urgent problems ?:  Yes  Understands what emergencies can be handled by PCP ?:  Yes  Ever any problems getting appointment with PCP for minor emergency/urgency problems?:  No  Practice Contacted Patient ?:  No  Pt had ED follow up with pcp/staff ?:  No    Seen for follow-up out of network ?:  No  Urgent care Education?:  Yes  Cassondra Severin stated she did try to call the office prior to going to ER, but no one answered  She states her headache is still pretty bad and would like a follow up visit by her PCP  I told her I would send a message on her behalf  Cassondra Severin and I spoke about Urgent cares in her area, I referred her to two, [facility] and [facility] Urgent care facilities

## 2018-03-30 ENCOUNTER — TELEPHONE (OUTPATIENT)
Dept: FAMILY MEDICINE CLINIC | Facility: CLINIC | Age: 38
End: 2018-03-30

## 2018-03-30 NOTE — TELEPHONE ENCOUNTER
----- Message from Vinita Nisreen sent at 3/29/2018  2:59 PM EDT -----  Regarding: ED outreach   Contact: [phone]  3/29/18 spoke with Delmi Purvis; Pt was recently seen in the ED, and would like a follow up visit, she is still having headaches without relief  Can you please call her to schedule a visit   Thank you Bhavana I team

## 2018-04-09 ENCOUNTER — TRANSCRIBE ORDERS (OUTPATIENT)
Dept: LAB | Facility: CLINIC | Age: 38
End: 2018-04-09

## 2018-04-09 DIAGNOSIS — N04.1 NEPHROTIC SYNDROME WITH FOCAL GLOMERULOSCLEROSIS: Primary | ICD-10-CM

## 2018-04-09 DIAGNOSIS — N18.30 CHRONIC KIDNEY DISEASE, STAGE III (MODERATE) (HCC): ICD-10-CM

## 2018-04-09 DIAGNOSIS — E78.5 HYPERLIPIDEMIA, UNSPECIFIED HYPERLIPIDEMIA TYPE: ICD-10-CM

## 2018-04-09 DIAGNOSIS — R80.9 PROTEINURIA, UNSPECIFIED TYPE: ICD-10-CM

## 2018-04-10 ENCOUNTER — APPOINTMENT (OUTPATIENT)
Dept: LAB | Facility: CLINIC | Age: 38
End: 2018-04-10
Payer: COMMERCIAL

## 2018-04-10 DIAGNOSIS — R80.9 PROTEINURIA, UNSPECIFIED TYPE: ICD-10-CM

## 2018-04-10 DIAGNOSIS — E78.5 HYPERLIPIDEMIA, UNSPECIFIED HYPERLIPIDEMIA TYPE: ICD-10-CM

## 2018-04-10 DIAGNOSIS — N04.1 NEPHROTIC SYNDROME WITH FOCAL GLOMERULOSCLEROSIS: ICD-10-CM

## 2018-04-10 DIAGNOSIS — N18.30 CHRONIC KIDNEY DISEASE, STAGE III (MODERATE) (HCC): ICD-10-CM

## 2018-04-10 LAB
ANION GAP SERPL CALCULATED.3IONS-SCNC: 6 MMOL/L (ref 4–13)
BUN SERPL-MCNC: 13 MG/DL (ref 5–25)
CALCIUM SERPL-MCNC: 8.7 MG/DL
CHLORIDE SERPL-SCNC: 109 MMOL/L (ref 100–108)
CO2 SERPL-SCNC: 26 MMOL/L (ref 21–32)
CREAT SERPL-MCNC: 1.36 MG/DL (ref 0.6–1.3)
GFR SERPL CREATININE-BSD FRML MDRD: 49 ML/MIN/1.73SQ M
GLUCOSE SERPL-MCNC: 94 MG/DL (ref 65–140)
POTASSIUM SERPL-SCNC: 4.1 MMOL/L (ref 3.5–5.3)
SODIUM SERPL-SCNC: 141 MMOL/L (ref 136–145)

## 2018-04-10 PROCEDURE — 82088 ASSAY OF ALDOSTERONE: CPT

## 2018-04-10 PROCEDURE — 36415 COLL VENOUS BLD VENIPUNCTURE: CPT

## 2018-04-10 PROCEDURE — 80048 BASIC METABOLIC PNL TOTAL CA: CPT

## 2018-04-10 PROCEDURE — 84244 ASSAY OF RENIN: CPT

## 2018-04-13 LAB — RENIN PLAS-CCNC: 2.17 NG/ML/HR (ref 0.17–5.38)

## 2018-04-15 LAB — ALDOST SERPL-MCNC: 6.5 NG/DL (ref 0–30)

## 2018-04-19 DIAGNOSIS — E03.9 ACQUIRED HYPOTHYROIDISM: ICD-10-CM

## 2018-04-19 DIAGNOSIS — F41.9 ANXIETY: ICD-10-CM

## 2018-04-19 DIAGNOSIS — I10 ESSENTIAL HYPERTENSION: Primary | ICD-10-CM

## 2018-04-21 RX ORDER — LEVOTHYROXINE SODIUM 88 UG/1
88 TABLET ORAL DAILY
Qty: 90 TABLET | Refills: 0 | Status: SHIPPED | OUTPATIENT
Start: 2018-04-21 | End: 2018-08-15 | Stop reason: SDUPTHER

## 2018-06-15 ENCOUNTER — OFFICE VISIT (OUTPATIENT)
Dept: NEUROLOGY | Facility: CLINIC | Age: 38
End: 2018-06-15
Payer: COMMERCIAL

## 2018-06-15 VITALS
SYSTOLIC BLOOD PRESSURE: 176 MMHG | HEIGHT: 68 IN | HEART RATE: 62 BPM | WEIGHT: 293 LBS | DIASTOLIC BLOOD PRESSURE: 97 MMHG | BODY MASS INDEX: 44.41 KG/M2

## 2018-06-15 DIAGNOSIS — G62.9 NEUROPATHY: ICD-10-CM

## 2018-06-15 DIAGNOSIS — G44.52 NEW DAILY PERSISTENT HEADACHE: Primary | ICD-10-CM

## 2018-06-15 PROBLEM — F41.8 DEPRESSION WITH ANXIETY: Status: ACTIVE | Noted: 2017-04-13

## 2018-06-15 PROCEDURE — 99205 OFFICE O/P NEW HI 60 MIN: CPT | Performed by: PSYCHIATRY & NEUROLOGY

## 2018-06-15 RX ORDER — PREDNISONE 20 MG/1
TABLET ORAL
Qty: 18 TABLET | Refills: 0 | Status: SHIPPED | OUTPATIENT
Start: 2018-06-15

## 2018-06-15 RX ORDER — TOPIRAMATE 25 MG/1
TABLET ORAL
Qty: 120 TABLET | Refills: 0 | Status: SHIPPED | OUTPATIENT
Start: 2018-06-15 | End: 2018-08-20 | Stop reason: SDUPTHER

## 2018-06-15 NOTE — PROGRESS NOTES
Patient ID: Emmett Powell is a 42 y o  female  Assessment/Plan:    No problem-specific Assessment & Plan notes found for this encounter  Diagnoses and all orders for this visit:    New daily persistent headache  -     topiramate (TOPAMAX) 25 mg tablet; 1 tab HS X 1 week, then increase by 1 tab every 1-2 weeks to an initial goal of 4 tabs HS  -     predniSONE 20 mg tablet; 4 tabs on day 1 and decrease by 1/2 tab per day X 8 days    Neuropathy  -     GURWINDER Screen w/ Reflex to Titer/Pattern; Future  -     C-reactive protein; Future  -     Cryoglobulin; Future  -     Folate; Future  -     Lyme Antibody Profile with reflex to WB; Future  -     Protein electrophoresis, serum; Future  -     Vitamin B12; Future  -     TSH, 3rd generation with Free T4 reflex; Future  -     Sedimentation rate, automated; Future  -     RPR; Future  -     RF Screen w/ Reflex to Titer; Future  -     Anti-neutrophilic cytoplasmic antibody; Future       Patient Instructions   Gamaliel Guy has been referred from the emergency room for evaluation for new daily persistent headache  On my exam in the office today she has no focal neurologic symptoms other than a 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  Her history is consistent with new daily persistent headache with episodic spikes  This occurs in the background of migraine headaches although it is unclear at this point if she is still getting flare-up migraines in that she is in pain all the time anyway  At this point she has not tried any preventative medications  Overall her neurologic exam is quite reassuring     - as an initial preventative medication we will begin Topamax 25 mg taken at bedtime    She can increase that by 1 tablet every 1-2 weeks to an initial target of 100 mg at bedtime     - as bridging therapy to help her to feel better quickly we will begin prednisone at a dose of 80 mg taken once in a day and then decreasing by 10 mg every day and to the medication is complete     - she is cleared to use Excedrin on an as needed basis but at prefer she use it less than 4 times per week in order to limit the risk of medication overuse headache  - considering her excellent neurologic exam at this point in time I do not feel that she requires a repeat MRI of the brain, although if her symptoms fail to improve as expected we may consider additional imaging     - if she finds that Topamax is either ineffective or if it has intolerable side effects we will consider protriptyline as our next best option     - in terms of minimal large fiber neuropathy in the bilateral feet, this may be related to her kidney disease however that is not certain at this point in time  I will request some lab values in order to test for reversible causes of neuropathy  This may be contributing to her sense of imbalance overall, but we will need to monitor over time to evaluate whether this is a static condition or if it is progressive  I will plan for her to contact our office in no more than 3 weeks time to report on her progress and to return to the office in 12 weeks  Subjective:    SHEA    Esme Amezquita presents for her initial evaluation for headaches    She notes that her headaches began in her 25s and that there is no known family history of headaches however the family history for her birth is not clear  Originally she seems to be getting 1 migraine headaches however for at least the last year she has actually had a daily persistent headache  In particular her baseline head pain is rated at 4-5/10  It is aching in nature makes her head feel heavy  She saw her eye doctor without the specific diagnosis forthcoming  He does not experience baseline photophobia or phonophobia  Breakthrough spikes:  She reports ongoing issues, approximately twice per day, with significant increased pain  This is also been going on for approximately the last year    She notes that the pain may be proceeded with a visual distortion with something that she is looking at appearing tilted or sideways  She feels off balance, typically, in the setting of a spike of pain  The onset is quite rapid  The spikes will last for approximately 1 hour and will include 8-9/10 pain along with phonophobia, dizziness which she describes as loss of balance, and difficulty with concentration  She notes that her headaches are triggered by stress as well as significant position changes although changes in position do not often trigger a full spiking headache  She notes no significant positional component to her headaches  She denies any pattern to the spike from breakthrough headaches  She notes that the location is quite variable but often some of the pain will be in the occipital region  Preventative:  None    Abortive:  Tylenol (not helpful); Excedrin (helpful, she is typically using approximately 1 dose per day at 1 tablet per dose)  The following portions of the patient's history were reviewed and updated as appropriate:   She  has a past medical history of Allergy to cats; Anesthesia; Asthma; Chronic headaches; Depression; Disease of thyroid gland; Focal chronic glomerulonephritis; Hypothyroidism; Left foot pain; Plantar fasciitis of left foot; Pollen allergy; and Wears glasses  She There are no active problems to display for this patient  She  has a past surgical history that includes Tonsillectomy; Frenulectomy, lingual; Vulva surgery; Laparoscopic endometriosis fulguration; Renal biopsy; and pr ankle scope,plantar fasciotomy (Left, 5/17/2017)  Her family history is not on file  She  reports that she has never smoked  She has never used smokeless tobacco  She reports that she drinks alcohol  She reports that she does not use drugs    Current Outpatient Prescriptions   Medication Sig Dispense Refill    albuterol (PROVENTIL HFA,VENTOLIN HFA) 90 mcg/act inhaler Inhale 2 puffs every 6 (six) hours as needed for wheezing      Cetirizine HCl (ZYRTEC PO) Take by mouth as needed      levothyroxine 88 mcg tablet Take 1 tablet (88 mcg total) by mouth daily 90 tablet 0    losartan (COZAAR) 25 mg tablet Take 25 mg by mouth every evening      sertraline (ZOLOFT) 50 mg tablet take 1 and 1/2 tablets by mouth once daily 135 tablet 1    VITAMIN D, ERGOCALCIFEROL, PO Take 5,000 Units by mouth daily       No current facility-administered medications for this visit  Current Outpatient Prescriptions on File Prior to Visit   Medication Sig    albuterol (PROVENTIL HFA,VENTOLIN HFA) 90 mcg/act inhaler Inhale 2 puffs every 6 (six) hours as needed for wheezing    Cetirizine HCl (ZYRTEC PO) Take by mouth as needed    levothyroxine 88 mcg tablet Take 1 tablet (88 mcg total) by mouth daily    losartan (COZAAR) 25 mg tablet Take 25 mg by mouth every evening    sertraline (ZOLOFT) 50 mg tablet take 1 and 1/2 tablets by mouth once daily    VITAMIN D, ERGOCALCIFEROL, PO Take 5,000 Units by mouth daily     No current facility-administered medications on file prior to visit  She is allergic to bupropion; morphine; nsaids; and pollen extract            Objective: There were no vitals taken for this visit  Physical Exam    Neurological Exam      Neurologic exam:  Awake and alert with appropriate mental status and language function  Cranial nerves II-XII were symmetrically intact bilaterally  Motor testing reveals 5/5 strength in the bilateral upper and lower extremities with no drift  Sensation is intact to temperature in the bilateral upper and lower extremitie the  But clearly decreased to vibration the distal feet up to the level of the bilateral ankles symmetrically  Coordination testing is unremarkable  Deep tendon reflexes were 3+ and symmetric  The funduscopic exam was unremarkable  There was significant sway but Romberg sign was absent  ROS:    Review of Systems   Constitutional: Negative      HENT: Negative  Eyes: Negative  Respiratory: Negative  Cardiovascular: Negative  Gastrointestinal: Negative  Endocrine: Negative  Genitourinary: Negative  Musculoskeletal: Positive for gait problem  Skin: Negative  Allergic/Immunologic: Negative  Neurological: Positive for dizziness, light-headedness and headaches  Hematological: Negative  Psychiatric/Behavioral: Negative

## 2018-06-15 NOTE — PATIENT INSTRUCTIONS
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  Her history is consistent with new daily persistent headache with episodic spikes  This occurs in the background of migraine headaches although it is unclear at this point if she is still getting flare-up migraines in that she is in pain all the time anyway  At this point she has not tried any preventative medications  Overall her neurologic exam is quite reassuring     - as an initial preventative medication we will begin Topamax 25 mg taken at bedtime  She can increase that by 1 tablet every 1-2 weeks to an initial target of 100 mg at bedtime     - as bridging therapy to help her to feel better quickly we will begin prednisone at a dose of 80 mg taken once in a day and then decreasing by 10 mg every day and to the medication is complete     - she is cleared to use Excedrin on an as needed basis but at prefer she use it less than 4 times per week in order to limit the risk of medication overuse headache  - considering her excellent neurologic exam at this point in time I do not feel that she requires a repeat MRI of the brain, although if her symptoms fail to improve as expected we may consider additional imaging     - if she finds that Topamax is either ineffective or if it has intolerable side effects we will consider protriptyline as our next best option     - in terms of minimal large fiber neuropathy in the bilateral feet, this may be related to her kidney disease however that is not certain at this point in time  I will request some lab values in order to test for reversible causes of neuropathy    This may be contributing to her sense of imbalance overall, but we will need to monitor over time to evaluate whether this is a static condition or if it is progressive  I will plan for her to contact our office in no more than 3 weeks time to report on her progress and to return to the office in 12 weeks

## 2018-07-19 ENCOUNTER — TELEPHONE (OUTPATIENT)
Dept: NEUROLOGY | Facility: CLINIC | Age: 38
End: 2018-07-19

## 2018-07-19 NOTE — TELEPHONE ENCOUNTER
See below, lab results in [facility] for call back  ----- Message from Tone La sent at 7/19/2018  8:28 AM EDT -----  Regarding: Test Results Question  Contact: [phone]  Dr Bhumi Menchaca, I went and had my bloodwork completed and I have received emails from the lab (Deana Jean went near my parents' home) saying some levels were irregular  Will this change my medications? The amount of major pain i am getting has decreased somewhat but yesterday while driving I had to pull over bc I became so dizzy from a sudden onset of a headache    Thank you so much,  Stephanie Toro

## 2018-07-19 NOTE — TELEPHONE ENCOUNTER
The labs look fine  There are 2 positive bands on the lyme screening test, there would need to be many more positive bands to be an actual positive test, this is considered negative for Lyme  There was also a mildly elevated CRP which is a very non-specific marker of inflammation but the elevation is quite small and all of the other labs are negative so it is not worrisome  Ok to increase the Topamax as currently planned  Please keep track of the spikes of headache (an rika on the phone is quite helpful in this regard), and message us in 2 weeks to update headache status

## 2018-07-19 NOTE — TELEPHONE ENCOUNTER
Patient states she always has some headache pain, but while driving yesterday her headache became so bad that she had to pull over because everything felt "slanted "  Not having as many as the painful spikes, just having a mild consistent pain  Topamax 2 tablets HS- will be starting 3 tablets HS tomorrow

## 2018-08-07 ENCOUNTER — TELEPHONE (OUTPATIENT)
Dept: NEUROLOGY | Facility: CLINIC | Age: 38
End: 2018-08-07

## 2018-08-07 NOTE — TELEPHONE ENCOUNTER
----- Message from Robin Odonnell MD sent at 8/7/2018  9:57 AM EDT -----  Please call Olive Romo  See the results document, many results listed  All look good  No clear reversible cause for her neuropathy at this point, so most likely related to renal disease  Also, how has she been doing on her initial migraine prevention medication? Any side-effects? And is the neuropathy worsening (more intense or affecting more of her legs)?

## 2018-08-07 NOTE — PROGRESS NOTES
Please call Jose M Naranjo  See the results document, many results listed  All look good  No clear reversible cause for her neuropathy at this point, so most likely related to renal disease  Also, how has she been doing on her initial migraine prevention medication? Any side-effects? And is the neuropathy worsening (more intense or affecting more of her legs)?

## 2018-08-08 NOTE — TELEPHONE ENCOUNTER
Made patient aware of below  Reports some tingling in her fingers and toes after increasing to 3 pills HS  Has improved over the past couple of days  Severe headaches had improved, however this past week the stronger headaches have returned  Been having 3/day with some dizziness  Will increase to 4 tabs daily on Friday

## 2018-08-15 DIAGNOSIS — E03.9 ACQUIRED HYPOTHYROIDISM: ICD-10-CM

## 2018-08-15 RX ORDER — LEVOTHYROXINE SODIUM 88 UG/1
88 TABLET ORAL DAILY
Qty: 90 TABLET | Refills: 0 | Status: SHIPPED | OUTPATIENT
Start: 2018-08-15

## 2018-08-19 DIAGNOSIS — G44.52 NEW DAILY PERSISTENT HEADACHE: ICD-10-CM

## 2018-08-20 RX ORDER — TOPIRAMATE 25 MG/1
TABLET ORAL
Qty: 120 TABLET | Refills: 3 | Status: SHIPPED | OUTPATIENT
Start: 2018-08-20 | End: 2018-10-11 | Stop reason: CLARIF

## 2018-08-20 NOTE — TELEPHONE ENCOUNTER
From: Feliberto Flores  Sent: 8/19/2018 9:00 PM EDT  Subject: Medication Renewal Request    Feliberto Flores would like a refill of the following medications:     topiramate (TOPAMAX) 25 mg tablet Earl Belle MD]    Preferred pharmacy: [address]  : [id number]    Comment:

## 2018-10-11 ENCOUNTER — OFFICE VISIT (OUTPATIENT)
Dept: NEUROLOGY | Facility: CLINIC | Age: 38
End: 2018-10-11
Payer: COMMERCIAL

## 2018-10-11 VITALS
SYSTOLIC BLOOD PRESSURE: 102 MMHG | BODY MASS INDEX: 45.83 KG/M2 | HEART RATE: 98 BPM | WEIGHT: 293 LBS | DIASTOLIC BLOOD PRESSURE: 64 MMHG

## 2018-10-11 DIAGNOSIS — G44.52 NEW DAILY PERSISTENT HEADACHE: Primary | ICD-10-CM

## 2018-10-11 DIAGNOSIS — G62.9 NEUROPATHY: ICD-10-CM

## 2018-10-11 PROCEDURE — 99214 OFFICE O/P EST MOD 30 MIN: CPT | Performed by: PSYCHIATRY & NEUROLOGY

## 2018-10-11 RX ORDER — AMITRIPTYLINE HYDROCHLORIDE 25 MG/1
25 TABLET, FILM COATED ORAL
Qty: 30 TABLET | Refills: 3 | Status: SHIPPED | OUTPATIENT
Start: 2018-10-11

## 2018-10-11 RX ORDER — AMITRIPTYLINE HYDROCHLORIDE 10 MG/1
25 TABLET, FILM COATED ORAL
Qty: 30 TABLET | Refills: 0 | Status: SHIPPED | OUTPATIENT
Start: 2018-10-11 | End: 2018-10-11 | Stop reason: SDUPTHER

## 2018-10-11 RX ORDER — AMITRIPTYLINE HYDROCHLORIDE 10 MG/1
25 TABLET, FILM COATED ORAL
Qty: 30 TABLET | Refills: 3 | Status: SHIPPED | OUTPATIENT
Start: 2018-10-11 | End: 2018-10-11 | Stop reason: SDUPTHER

## 2018-10-11 RX ORDER — SUMATRIPTAN 100 MG/1
100 TABLET, FILM COATED ORAL ONCE AS NEEDED
Qty: 9 TABLET | Refills: 1 | Status: SHIPPED | OUTPATIENT
Start: 2018-10-11

## 2018-10-11 NOTE — PROGRESS NOTES
Patient ID: Kristen Lester is a 42 y o  female  Assessment/Plan: This is a 64-year-old female who is here as a follow-up of headaches, persistent daily headaches with a component of migraine headaches, neuropathy  She has more than 15+ headaches per month  She has failed topamax at this time due to side effect of (tingling/numbness and also with her history of chronic pylonephritis)  Plan:   -will switch to amitriptyline today and I did talk to her regarding side effects of the medication, discontinue topamax    -Continue Abortive therapy with tylenol, imitrex 100mg, I did send a prescription to the pharmacy  -Avoid using analgesics no more than 3 times a week to avoid rebound headaches  Follow up with me in 4-5 months  Patient/Guardian was advised to the call the office if they have any questions and concerns in the meantime  Patient/Guardian does understand that if they have any new stroke like symptoms such as facial droop on one side, weakness/paralysis on either side, speech trouble, numbness on one side, balance issues, any vision changes, or any new headache, to call 9-1-1 immediately or to proceed to the nearest ER immediately  Problem List Items Addressed This Visit     New daily persistent headache - Primary    Relevant Medications    SUMAtriptan (IMITREX) 100 mg tablet    amitriptyline (ELAVIL) 10 mg tablet    Neuropathy    Relevant Medications    SUMAtriptan (IMITREX) 100 mg tablet           Subjective:    HPI    This is a 41 y/o F who is here as a follow up of headches, new daily persistent headaches  She also has neuropathy as well  She likely also has migraine headaches as well  She was seen by Dr Kendy Masters during her last visit in June 2018  That time she was started on Topamax 25 mg with an initial target with an increase up to 100 mg at bedtime slowly over few weeks  She was also started on prednisone 80 mg as bridging therapy    She could not tolerate the dose of 100mg because of the tingling/numbness and she cut it down to 75mg PO qdaily  She says that her headaches are better in terms of intensity but they are frequent and they are daily  She does have chronic pylenephritis and her nephrologist did not feel comfortable with the topamax and she would rather come off of that medication  She does not have any cardiac history  She says that they are about 6/10 when she does have them  She says they are pounding/throbbing, dull nagging pain  She says that pain is located sides and base of the head  Headaches are now daily  Her headaches last for hours  The following portions of the patient's history were reviewed and updated as appropriate:   She  has a past medical history of Allergy to cats; Anesthesia; Asthma; Chronic headaches; Depression; Disease of thyroid gland; Focal chronic glomerulonephritis; Hypothyroidism; Left foot pain; Plantar fasciitis of left foot; Pollen allergy; and Wears glasses  She   Patient Active Problem List    Diagnosis Date Noted    New daily persistent headache 06/15/2018    Neuropathy 06/15/2018    Depression with anxiety 04/13/2017    Chronic kidney disease 12/23/2016    Benign essential HTN 10/15/2016    Glomerulonephritis, focal sclerosing 10/15/2016    Encounter for gynecological examination without abnormal finding 09/21/2016    Obesity 07/23/2015    Vertigo 10/11/2004     She  has a past surgical history that includes Tonsillectomy; Frenulectomy, lingual; Vulva surgery; Laparoscopic endometriosis fulguration; Renal biopsy; and pr ankle scope,plantar fasciotomy (Left, 5/17/2017)  Her family history is not on file  She  reports that she has never smoked  She has never used smokeless tobacco  She reports that she drinks alcohol  She reports that she does not use drugs    Current Outpatient Prescriptions   Medication Sig Dispense Refill    albuterol (PROVENTIL HFA,VENTOLIN HFA) 90 mcg/act inhaler Inhale 2 puffs every 6 (six) hours as needed for wheezing      Cetirizine HCl (ZYRTEC PO) Take by mouth as needed      levothyroxine 88 mcg tablet Take 1 tablet (88 mcg total) by mouth daily 90 tablet 0    losartan (COZAAR) 25 mg tablet Take 25 mg by mouth every evening      predniSONE 20 mg tablet 4 tabs on day 1 and decrease by 1/2 tab per day X 8 days 18 tablet 0    sertraline (ZOLOFT) 50 mg tablet take 1 and 1/2 tablets by mouth once daily 135 tablet 1    VITAMIN D, ERGOCALCIFEROL, PO Take 5,000 Units by mouth daily      amitriptyline (ELAVIL) 10 mg tablet Take 2 5 tablets (25 mg total) by mouth daily at bedtime 30 tablet 0    SUMAtriptan (IMITREX) 100 mg tablet Take 1 tablet (100 mg total) by mouth once as needed for migraine for up to 1 dose 9 tablet 1     No current facility-administered medications for this visit  Current Outpatient Prescriptions on File Prior to Visit   Medication Sig    albuterol (PROVENTIL HFA,VENTOLIN HFA) 90 mcg/act inhaler Inhale 2 puffs every 6 (six) hours as needed for wheezing    Cetirizine HCl (ZYRTEC PO) Take by mouth as needed    levothyroxine 88 mcg tablet Take 1 tablet (88 mcg total) by mouth daily    losartan (COZAAR) 25 mg tablet Take 25 mg by mouth every evening    predniSONE 20 mg tablet 4 tabs on day 1 and decrease by 1/2 tab per day X 8 days    sertraline (ZOLOFT) 50 mg tablet take 1 and 1/2 tablets by mouth once daily    VITAMIN D, ERGOCALCIFEROL, PO Take 5,000 Units by mouth daily    [DISCONTINUED] topiramate (TOPAMAX) 25 mg tablet 4 tabs HS  No current facility-administered medications on file prior to visit  She is allergic to bupropion; morphine; nsaids; and pollen extract            Objective:    Blood pressure 102/64, pulse 98, weight 135 kg (297 lb)  Physical Exam  General - alert, awake, follows commands  Skin - no lesions  Motor - 5/5   Gait - normal  Neurological Exam      ROS:  I reviewed ROS  Review of Systems   Constitutional: Negative    Negative for appetite change and fever  HENT: Negative  Negative for hearing loss, tinnitus, trouble swallowing and voice change  Eyes: Negative  Negative for photophobia and pain  Respiratory: Negative  Negative for shortness of breath  Cardiovascular: Negative  Negative for palpitations  Gastrointestinal: Negative  Negative for nausea and vomiting  Endocrine: Negative  Negative for cold intolerance and heat intolerance  Genitourinary: Negative  Negative for dysuria, frequency and urgency  Musculoskeletal: Negative  Negative for myalgias and neck pain  Skin: Negative  Negative for rash  Neurological: Positive for dizziness, light-headedness and headaches  Negative for tremors, seizures, syncope, facial asymmetry, speech difficulty, weakness and numbness  Hematological: Negative  Does not bruise/bleed easily  Psychiatric/Behavioral: Negative  Negative for confusion, hallucinations and sleep disturbance

## (undated) DEVICE — CAST PADDING 4 IN SYNTHETIC NON-STRL

## (undated) DEVICE — UNIVERSAL  MINOR EXTREMITY PK: Brand: CARDINAL HEALTH

## (undated) DEVICE — INTENDED FOR TISSUE SEPARATION, AND OTHER PROCEDURES THAT REQUIRE A SHARP SURGICAL BLADE TO PUNCTURE OR CUT.: Brand: BARD-PARKER ® CARBON RIB-BACK BLADES

## (undated) DEVICE — SYRINGE 3ML LL

## (undated) DEVICE — GLOVE INDICATOR PI UNDERGLOVE SZ 8 BLUE

## (undated) DEVICE — COBAN 4 IN STERILE

## (undated) DEVICE — STRL COTTON TIP APPLCTR 6IN PK: Brand: CARDINAL HEALTH

## (undated) DEVICE — NEEDLE 25G X 1 1/2

## (undated) DEVICE — PAD CAST 4 IN COTTON NON STERILE

## (undated) DEVICE — SYRINGE 20ML LL

## (undated) DEVICE — NEEDLE 18 G X 1 1/2

## (undated) DEVICE — SYRINGE 10ML LL

## (undated) DEVICE — SCD SEQUENTIAL COMPRESSION COMFORT SLEEVE MEDIUM KNEE LENGTH: Brand: KENDALL SCD

## (undated) DEVICE — GAUZE SPONGES,16 PLY: Brand: CURITY

## (undated) DEVICE — STRETCH BANDAGE: Brand: CURITY

## (undated) DEVICE — SUT ETHILON 4-0 PS-2 18 IN 1667H

## (undated) DEVICE — CURITY NON-ADHERENT STRIPS: Brand: CURITY

## (undated) DEVICE — STOCKINETTE REGULAR

## (undated) DEVICE — BLADE ENDO TRAC

## (undated) DEVICE — CHLORAPREP HI-LITE 26ML ORANGE

## (undated) DEVICE — URETERAL CATHETER ADAPTOR TIP

## (undated) DEVICE — GLOVE SRG BIOGEL 7.5